# Patient Record
Sex: FEMALE | URBAN - METROPOLITAN AREA
[De-identification: names, ages, dates, MRNs, and addresses within clinical notes are randomized per-mention and may not be internally consistent; named-entity substitution may affect disease eponyms.]

---

## 2019-12-17 ENCOUNTER — HOSPITAL ENCOUNTER (OUTPATIENT)
Dept: TELEMEDICINE | Facility: HOSPITAL | Age: 84
Discharge: HOME | End: 2019-12-17
Payer: MEDICARE

## 2019-12-17 PROCEDURE — G0425 INPT/ED TELECONSULT30: HCPCS | Mod: GT,,, | Performed by: PSYCHIATRY & NEUROLOGY

## 2019-12-17 PROCEDURE — G0425 PR INPT TELEHEALTH CONSULT 30M: ICD-10-PCS | Mod: GT,,, | Performed by: PSYCHIATRY & NEUROLOGY

## 2019-12-17 NOTE — SUBJECTIVE & OBJECTIVE
Woke up with symptoms?: no    Recent bleeding noted: no  Does the patient take any Blood Thinners? no  Medications: Antiplatelets:  none      Past Medical History: hypertension, diabetes and dementia    Past Surgical History: no major surgeries within the last 2 weeks    Family History: no relevant history    Social History: no smoking, no drinking, no drugs    Allergies: Allergies have not been reviewed No known drug allergies    Review of Systems   Constitutional: Negative for chills, diaphoresis and fever.   HENT: Negative for hearing loss, tinnitus and trouble swallowing.    Eyes: Negative for visual disturbance.   Respiratory: Negative for shortness of breath.    Cardiovascular: Negative for chest pain and palpitations.   Gastrointestinal: Negative for vomiting.   Endocrine: Negative for cold intolerance.   Genitourinary: Negative for hematuria.   Musculoskeletal: Negative for neck pain and neck stiffness.   Skin: Negative for rash.   Allergic/Immunologic: Negative for immunocompromised state.   Neurological: Positive for dizziness. Negative for facial asymmetry, speech difficulty and weakness.   Hematological: Does not bruise/bleed easily.   Psychiatric/Behavioral: Positive for confusion. Negative for agitation and behavioral problems.     Objective:   Vitals: There were no vitals taken for this visit. BP: 154/42, Respiratory Rate: 17 and Heart Rate: 82    CT READ: Yes  No hemmorhage. No mass effect. No early infarct signs.     Physical Exam

## 2019-12-17 NOTE — SUBJECTIVE & OBJECTIVE
Woke up with symptoms?: {YES NO:71611}    Recent bleeding noted: no  Does the patient take any Blood Thinners? no  Medications: Antiplatelets:  no      Past Medical History: hypertension, diabetes and dementia    Past Surgical History: no major surgeries within the last 2 weeks    Family History: no relevant history    Social History: no smoking, no drinking, no drugs    Allergies: Allergies have not been reviewed No known drug allergies    Review of Systems  Objective:   Vitals: There were no vitals taken for this visit. BP: 154/42, Respiratory Rate: 17 and Heart Rate: 78    CT READ: Yes  No hemmorhage. No mass effect. No early infarct signs.     Physical Exam   Constitutional: She appears well-developed and well-nourished.   HENT:   Head: Normocephalic and atraumatic.   Eyes: Pupils are equal, round, and reactive to light. EOM are normal.   Neck: Normal range of motion. Neck supple.   Cardiovascular: Normal rate and regular rhythm.   Pulmonary/Chest: No respiratory distress.   Abdominal: She exhibits no mass.   Genitourinary:   Genitourinary Comments: No performed   Musculoskeletal: Normal range of motion. She exhibits no edema or deformity.   Neurological: She is alert. No cranial nerve deficit or sensory deficit. Coordination normal.   Disoriented to year, month, place     Skin: No rash noted. She is not diaphoretic. No erythema.   Psychiatric: She has a normal mood and affect.   Nursing note and vitals reviewed.

## 2019-12-17 NOTE — CONSULTS
[facility]  Vascular Neurology  Comprehensive Stroke Center  Tele-Consultation Note      Consults    Consulting Provider: KEELEY ESPINOZA  Current Providers  No providers found            Patient Location: [facility] Emergency Department  Spoke hospital nurse at bedside with patient assisting consultant.     Patient information was obtained from relative(s) and ED nurse.         Assessment/Plan:    85 y/o with HTN, DM, dementia, brought in after sustaining a transient, witnessed episode of generalized body stiffening that lasted approximately 30 seconds and was followed by increasing confusion. Had a similar episode in the recent past.  Presently, she is back to her baseline.  CTH without acute abnormality.  Low index suspicion for stroke. Overall presentation more concerning for a tonic GTC seizure in the setting of underlying dementia.  Consider empiric trial of keppra. General neurology and EEG as outpatient.      STROKE DOCUMENTATION     Acute Stroke Times:   Acute Stroke Times   Last Known Normal Date: 12/17/19  Last Known Normal Time: 1210  Symptom Onset Date: 12/17/19  Symptom Onset Time: 1210  Stroke Team Called Date: 12/17/19  Stroke Team Called Time: 1413  Stroke Team Arrival Date: 12/17/19  Stroke Team Arrival Time: 1420  CT Interpretation Time: 1420  Decision to Treat Time for Alteplase: (No iv alteplase)  Decision to Treat Time for IR: (No IR candidate)    NIH Scale:  Interval: baseline  1a. Level of Consciousness: 0-->Alert, keenly responsive  1b. LOC Questions: 2-->Answers neither question correctly  1c. LOC Commands: 0-->Performs both tasks correctly  2. Best Gaze: 0-->Normal  3. Visual: 0-->No visual loss  4. Facial Palsy: 0-->Normal symmetrical movements  5a. Motor Arm, Left: 0-->No drift, limb holds 90 (or 45) degrees for full 10 secs  5b. Motor Arm, Right: 0-->No drift, limb holds 90 (or 45) degrees for full  10 secs  6a. Motor Leg, Left: 0-->No drift, leg holds 30 degree position for full 5 secs  6b. Motor Leg, Right: 0-->No drift, leg holds 30 degree position for full 5 secs  7. Limb Ataxia: 0-->Absent  8. Sensory: 0-->Normal, no sensory loss  9. Best Language: 0-->No aphasia, normal  10. Dysarthria: 0-->Normal  11. Extinction and Inattention (formerly Neglect): 0-->No abnormality  Total (NIH Stroke Scale): 2     Modified Lee Score: 1  Milad Coma Scale:14   ABCD2 Score:    [id number] Score:   HAS -BLED Score:   ICH Score:   Hunt & Peters Classification:       Diagnoses: transient generalized body stiffness.   No new Assessment & Plan notes have been filed under this hospital service since the last note was generated.  Service: Vascular Neurology      There were no vitals taken for this visit.  Alteplase Eligible?: No  Alteplase Recommendation: Alteplase not recommended due to Symptoms resolved   Possible Interventional Revascularization Candidate? No; No significant neurological deficit    Disposition Recommendation: admit to inpatient  do not transfer    Subjective:     History of Present Illness: 85 y/o with HTN, DM, dementia, brought in after sustaining a transient, witnessed episode of generalized body stiffening that lasted approximately 30 seconds and was followed by increasing confusion. Had a similar episode in the recent past.  Presently, she is back to her baseline.  No notes on file      Woke up with symptoms?: NO    Recent bleeding noted: no  Does the patient take any Blood Thinners? no  Medications: Antiplatelets:  no      Past Medical History: hypertension, diabetes and dementia    Past Surgical History: no major surgeries within the last 2 weeks    Family History: no relevant history    Social History: no smoking, no drinking, no drugs    Allergies: Allergies have not been reviewed No known drug allergies    Review of Systems    Review of Systems   Constitutional: Negative for chills, diaphoresis  and fever.   HENT: Negative for hearing loss, tinnitus and trouble swallowing.    Eyes: Negative for visual disturbance.   Respiratory: Negative for shortness of breath.    Cardiovascular: Negative for chest pain and palpitations.   Gastrointestinal: Negative for vomiting.   Endocrine: Negative for cold intolerance.   Genitourinary: Negative for hematuria.   Musculoskeletal: Negative for gait problem, neck pain and neck stiffness.   Skin: Negative for rash.   Allergic/Immunologic: Negative for immunocompromised state.   Neurological: Negative for facial asymmetry and speech difficulty. Negative for dizziness, weakness, numbness and headaches.   Hematological: Does not bruise/bleed easily.   Psychiatric/Behavioral: Negative for agitation, behavioral problems and confusion.         Objective:   Vitals: There were no vitals taken for this visit. BP: 154/42, Respiratory Rate: 17 and Heart Rate: 78    CT READ: Yes  No hemmorhage. No mass effect. No early infarct signs.     Physical Exam   Constitutional: She appears well-developed and well-nourished.   HENT:   Head: Normocephalic and atraumatic.   Eyes: Pupils are equal, round, and reactive to light. EOM are normal.   Neck: Normal range of motion. Neck supple.   Cardiovascular: Normal rate and regular rhythm.   Pulmonary/Chest: No respiratory distress.   Abdominal: She exhibits no mass.   Genitourinary:   Genitourinary Comments: No performed   Musculoskeletal: Normal range of motion. She exhibits no edema or deformity.   Neurological: She is alert. No cranial nerve deficit or sensory deficit. Coordination normal.   Disoriented to year, month, place     Skin: No rash noted. She is not diaphoretic. No erythema.   Psychiatric: She has a normal mood and affect.   Nursing note and vitals reviewed.            Recommended the emergency room physician to have a brief discussion with the patient and/or family if available regarding the risks and benefits of treatment, and to  briefly document the occurrence of that discussion in his clinical encounter note.     The attending portion of this evaluation, treatment, and documentation was performed per Peter Lazar MD via audiovisual.    Billing code:  (non-intervention mild to moderate stroke, TIA, some mimics)    · This patient has a critical neurological condition/illness, with some potential for high morbidity and mortality.  · There is a moderate probability for acute neurological change leading to clinical and possibly life-threatening deterioration requiring highest level of physician preparedness for urgent intervention.  · Care was coordinated with other physicians involved in the patient's care.  · Radiologic studies and laboratory data were reviewed and interpreted, and plan of care was re-assessed based on the results.  · Diagnosis, treatment options and prognosis may have been discussed with the patient and/or family members or caregiver.      In your opinion, this was a: Tier 1 N/A    Consult End Time: 235 pm    Peter Lazar MD  Com[facility] Stroke Center  Vascular Neurology   [facility]